# Patient Record
Sex: MALE | Race: OTHER | HISPANIC OR LATINO | URBAN - METROPOLITAN AREA
[De-identification: names, ages, dates, MRNs, and addresses within clinical notes are randomized per-mention and may not be internally consistent; named-entity substitution may affect disease eponyms.]

---

## 2018-01-18 ENCOUNTER — APPOINTMENT (RX ONLY)
Dept: URBAN - METROPOLITAN AREA CLINIC 4 | Facility: CLINIC | Age: 83
Setting detail: DERMATOLOGY
End: 2018-01-18

## 2018-01-18 DIAGNOSIS — L20.89 OTHER ATOPIC DERMATITIS: ICD-10-CM

## 2018-01-18 DIAGNOSIS — L72.8 OTHER FOLLICULAR CYSTS OF THE SKIN AND SUBCUTANEOUS TISSUE: ICD-10-CM

## 2018-01-18 DIAGNOSIS — D18.0 HEMANGIOMA: ICD-10-CM

## 2018-01-18 DIAGNOSIS — D22 MELANOCYTIC NEVI: ICD-10-CM

## 2018-01-18 DIAGNOSIS — L82.1 OTHER SEBORRHEIC KERATOSIS: ICD-10-CM

## 2018-01-18 DIAGNOSIS — L57.0 ACTINIC KERATOSIS: ICD-10-CM

## 2018-01-18 DIAGNOSIS — L81.4 OTHER MELANIN HYPERPIGMENTATION: ICD-10-CM

## 2018-01-18 DIAGNOSIS — I87.2 VENOUS INSUFFICIENCY (CHRONIC) (PERIPHERAL): ICD-10-CM

## 2018-01-18 PROBLEM — D22.72 MELANOCYTIC NEVI OF LEFT LOWER LIMB, INCLUDING HIP: Status: ACTIVE | Noted: 2018-01-18

## 2018-01-18 PROBLEM — D22.62 MELANOCYTIC NEVI OF LEFT UPPER LIMB, INCLUDING SHOULDER: Status: ACTIVE | Noted: 2018-01-18

## 2018-01-18 PROBLEM — L20.84 INTRINSIC (ALLERGIC) ECZEMA: Status: ACTIVE | Noted: 2018-01-18

## 2018-01-18 PROBLEM — D18.01 HEMANGIOMA OF SKIN AND SUBCUTANEOUS TISSUE: Status: ACTIVE | Noted: 2018-01-18

## 2018-01-18 PROBLEM — I83.12 VARICOSE VEINS OF LEFT LOWER EXTREMITY WITH INFLAMMATION: Status: ACTIVE | Noted: 2018-01-18

## 2018-01-18 PROBLEM — D22.71 MELANOCYTIC NEVI OF RIGHT LOWER LIMB, INCLUDING HIP: Status: ACTIVE | Noted: 2018-01-18

## 2018-01-18 PROBLEM — D22.61 MELANOCYTIC NEVI OF RIGHT UPPER LIMB, INCLUDING SHOULDER: Status: ACTIVE | Noted: 2018-01-18

## 2018-01-18 PROBLEM — D22.5 MELANOCYTIC NEVI OF TRUNK: Status: ACTIVE | Noted: 2018-01-18

## 2018-01-18 PROCEDURE — 17003 DESTRUCT PREMALG LES 2-14: CPT

## 2018-01-18 PROCEDURE — 99214 OFFICE O/P EST MOD 30 MIN: CPT | Mod: 25

## 2018-01-18 PROCEDURE — ? SUNSCREEN RECOMMENDATIONS

## 2018-01-18 PROCEDURE — 17000 DESTRUCT PREMALG LESION: CPT

## 2018-01-18 PROCEDURE — ? COUNSELING

## 2018-01-18 PROCEDURE — ? LIQUID NITROGEN

## 2018-01-18 ASSESSMENT — LOCATION DETAILED DESCRIPTION DERM
LOCATION DETAILED: RIGHT CENTRAL MALAR CHEEK
LOCATION DETAILED: LEFT ULNAR DORSAL HAND
LOCATION DETAILED: LEFT SUPERIOR MEDIAL UPPER BACK
LOCATION DETAILED: LEFT PROXIMAL DORSAL FOREARM
LOCATION DETAILED: RIGHT DISTAL POSTERIOR UPPER ARM
LOCATION DETAILED: RIGHT RIB CAGE
LOCATION DETAILED: LEFT PROXIMAL PRETIBIAL REGION
LOCATION DETAILED: RIGHT RADIAL DORSAL HAND
LOCATION DETAILED: RIGHT SUPERIOR MEDIAL MIDBACK
LOCATION DETAILED: LEFT SCAPHA
LOCATION DETAILED: RIGHT ANTERIOR PROXIMAL THIGH
LOCATION DETAILED: RIGHT LATERAL PROXIMAL PRETIBIAL REGION
LOCATION DETAILED: RIGHT INFERIOR MEDIAL UPPER BACK
LOCATION DETAILED: PERIUMBILICAL SKIN
LOCATION DETAILED: LEFT INFERIOR ANTERIOR NECK
LOCATION DETAILED: SUPERIOR THORACIC SPINE
LOCATION DETAILED: LEFT ANTERIOR PROXIMAL THIGH
LOCATION DETAILED: LEFT CENTRAL MALAR CHEEK
LOCATION DETAILED: RIGHT PROXIMAL DORSAL FOREARM
LOCATION DETAILED: LEFT PROXIMAL POSTERIOR UPPER ARM

## 2018-01-18 ASSESSMENT — LOCATION SIMPLE DESCRIPTION DERM
LOCATION SIMPLE: LEFT POSTERIOR UPPER ARM
LOCATION SIMPLE: ABDOMEN
LOCATION SIMPLE: RIGHT CHEEK
LOCATION SIMPLE: UPPER BACK
LOCATION SIMPLE: LEFT THIGH
LOCATION SIMPLE: LEFT UPPER BACK
LOCATION SIMPLE: RIGHT HAND
LOCATION SIMPLE: LEFT CHEEK
LOCATION SIMPLE: LEFT HAND
LOCATION SIMPLE: LEFT PRETIBIAL REGION
LOCATION SIMPLE: RIGHT LOWER BACK
LOCATION SIMPLE: RIGHT FOREARM
LOCATION SIMPLE: RIGHT POSTERIOR UPPER ARM
LOCATION SIMPLE: LEFT ANTERIOR NECK
LOCATION SIMPLE: LEFT EAR
LOCATION SIMPLE: RIGHT UPPER BACK
LOCATION SIMPLE: RIGHT THIGH
LOCATION SIMPLE: RIGHT PRETIBIAL REGION
LOCATION SIMPLE: LEFT FOREARM

## 2018-01-18 ASSESSMENT — LOCATION ZONE DERM
LOCATION ZONE: TRUNK
LOCATION ZONE: NECK
LOCATION ZONE: HAND
LOCATION ZONE: LEG
LOCATION ZONE: FACE
LOCATION ZONE: EAR
LOCATION ZONE: ARM

## 2018-01-18 NOTE — PROCEDURE: LIQUID NITROGEN
Duration Of Freeze Thaw-Cycle (Seconds): 3
Consent: The patient's consent was obtained including but not limited to risks of crusting, scabbing, blistering, scarring, darker or lighter pigmentary change, recurrence, incomplete removal and infection.
Render Post-Care Instructions In Note?: no
Number Of Freeze-Thaw Cycles: 2 freeze-thaw cycles
Post-Care Instructions: I reviewed with the patient in detail post-care instructions. Patient is to wear sunprotection, and avoid picking at any of the treated lesions. Pt may apply Vaseline to crusted or scabbing areas.
Detail Level: Detailed

## 2019-11-22 ENCOUNTER — HOSPITAL ENCOUNTER (EMERGENCY)
Facility: MEDICAL CENTER | Age: 84
End: 2019-11-22
Attending: EMERGENCY MEDICINE
Payer: MEDICARE

## 2019-11-22 VITALS
WEIGHT: 169 LBS | BODY MASS INDEX: 27.16 KG/M2 | OXYGEN SATURATION: 91 % | TEMPERATURE: 98.2 F | HEART RATE: 75 BPM | SYSTOLIC BLOOD PRESSURE: 143 MMHG | HEIGHT: 66 IN | RESPIRATION RATE: 16 BRPM | DIASTOLIC BLOOD PRESSURE: 60 MMHG
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
## 2019-11-22 DIAGNOSIS — F03.90 DEMENTIA WITHOUT BEHAVIORAL DISTURBANCE, UNSPECIFIED DEMENTIA TYPE: ICD-10-CM

## 2019-11-22 PROCEDURE — 99284 EMERGENCY DEPT VISIT MOD MDM: CPT

## 2019-11-22 PROCEDURE — 304561 HCHG STAT O2

## 2019-11-22 ASSESSMENT — LIFESTYLE VARIABLES
TOTAL SCORE: 0
TOTAL SCORE: 0
HAVE PEOPLE ANNOYED YOU BY CRITICIZING YOUR DRINKING: NO
TOTAL SCORE: 0
TOTAL SCORE: 0
EVER FELT BAD OR GUILTY ABOUT YOUR DRINKING: NO
TOTAL SCORE: 0
CONSUMPTION TOTAL: INCOMPLETE
DO YOU DRINK ALCOHOL: NO
HAVE PEOPLE ANNOYED YOU BY CRITICIZING YOUR DRINKING: NO
EVER HAD A DRINK FIRST THING IN THE MORNING TO STEADY YOUR NERVES TO GET RID OF A HANGOVER: NO
TOTAL SCORE: 0
EVER HAD A DRINK FIRST THING IN THE MORNING TO STEADY YOUR NERVES TO GET RID OF A HANGOVER: NO
CONSUMPTION TOTAL: INCOMPLETE
HAVE YOU EVER FELT YOU SHOULD CUT DOWN ON YOUR DRINKING: NO
HAVE YOU EVER FELT YOU SHOULD CUT DOWN ON YOUR DRINKING: NO
EVER FELT BAD OR GUILTY ABOUT YOUR DRINKING: NO
DO YOU DRINK ALCOHOL: NO

## 2019-11-22 ASSESSMENT — PAIN SCALES - WONG BAKER: WONGBAKER_NUMERICALRESPONSE: HURTS A LITTLE MORE

## 2019-11-22 ASSESSMENT — PAIN DESCRIPTION - DESCRIPTORS: DESCRIPTORS: ACHING

## 2019-11-22 NOTE — ED TRIAGE NOTES
Pt presents to ED via EMS for GLF at home no thinners, no LOC, no head strike.  Pt reports right rib and back pain. Pt has dementia, wife main information provider.

## 2019-11-22 NOTE — ED PROVIDER NOTES
"ED Provider Note    Scribed for Krys Grande M.D. by John Anderson. 11/22/2019, 9:41 AM.    Primary care provider: Carlton PEREZ M.D.  Means of arrival: EMS  History obtained from: patient, spouse  History limited by: dementia    CHIEF COMPLAINT  Chief Complaint   Patient presents with   • T-5000 FALL   • Rib Pain       HPI  Ankush Lira is a 89 y.o. male with a history of dementia who presents to the Emergency Department complaining of rib and back pain status post ground level fall sustained just prior to arrival.  Wife states that the patient was walking down to the basement and sustained a ground level fall at the bottom of the stairs onto concrete. She reports that there were no signs of impact to his head and his mentation is at baseline at this time. Wife was unable to get the patient to his feet, so EMS was called.     Further HPI cannot be obtained due to the patient's dementia.    REVIEW OF SYSTEMS  Pertinent positives include ground level fall, rib pain, back pain.      Further ROS cannot be obtained due to the patient's dementia.    PAST MEDICAL HISTORY   Dementia    SURGICAL HISTORY  patient denies any surgical history    SOCIAL HISTORY  Patient presents to the ED with wife who he lives with.     FAMILY HISTORY  None noted    CURRENT MEDICATIONS  Current Outpatient Medications:   •  NON SPECIFIED, Synthroid med , Disp: , Rfl:     ALLERGIES  Not on File    PHYSICAL EXAM  VITAL SIGNS: /60   Pulse 75   Temp 36.8 °C (98.2 °F) (Temporal)   Resp 16   Ht 1.676 m (5' 6\")   Wt 76.7 kg (169 lb)   SpO2 91%   BMI 27.28 kg/m²   Constitutional: Alert in no apparent distress. Well apearing  HENT: Normocephalic, Atraumatic, Bilateral external ears normal. Nose normal.   Eyes:  Conjunctiva normal, non-icteric.   Heart: Regular rate and rhythm no murmurs rubs or gallops  Lungs: Non-labored respirations  Skin: Warm, Dry, No erythema, No rash.   Neurologic: Alert, Grossly non-focal. "   Psychiatric: Demented but alert and appropriate  Extremities: no obvious deformities        COURSE & MEDICAL DECISION MAKING  Pertinent Labs & Imaging studies reviewed. (See chart for details)    Differential diagnoses include but are not limited to: multiple system contusion    9:41 AM - Patient seen and examined at bedside. Wife is the primary history provider. She informs that the patient is at baseline mentation. Patient is somewhat uncooperative and wife is assisting with exam and history taking.  The patient has significant dementia and is quite paranoid about everybody in the room especially people that he does not know.  He is not complaining of any pain but he would not allow me to assess his back.    10:23 AM  Ultimately patient was able to ambulate without difficulty to the bathroom he was not complaining of any pain.  His wife does not think he hit his head he is not on any blood thinners I do not think imaging is necessary at this time but of advised him to return should he develop any worsening pain       The patient will return for new or worsening symptoms and is stable at the time of discharge. Patient was given return precautions. Patient and/or family member verbalizes understanding and will comply.    DISPOSITION:  Patient will be discharged home in stable condition.    FOLLOW UP:  Carlton PEREZ M.D.  [address] [facility] [address] [phone]    Schedule an appointment as soon as possible for a visit in 1 week      [facility], Emergency Dept  [address] [facility] [address] [phone]    Return for worsening pain, weakness or other concerns      OUTPATIENT MEDICATIONS:  New Prescriptions    No medications on file         FINAL IMPRESSION  1. Fall, initial encounter    2. Dementia without behavioral disturbance, unspecified dementia type (HCC)         This dictation has been created using voice recognition software and/or scribes. The accuracy of  the dictation is limited by the abilities of the software and the expertise of the scribes. I expect there may be some errors of grammar and possibly content. I made every attempt to manually correct the errors within my dictation. However, errors related to voice recognition software and/or scribes may still exist and should be interpreted within the appropriate context.     IJohn (Scribe), am scribing for, and in the presence of, Krys Grande M.D..    Electronically signed by: John Anderson (Scribe), 11/22/2019    I, Krys Grande M.D. personally performed the services described in this documentation, as scribed by John Anderson in my presence, and it is both accurate and complete. C    The note accurately reflects work and decisions made by me.  Krys Grande  11/22/2019  10:25 AM

## 2019-11-22 NOTE — ED NOTES
Patient understands discharge instructions. Given all DC education,  f/u appointments. Pt verbalized understanding.  In no distress. IV and telemetry dc'd. Has all belongings.  Ambulating well with steady gait. Will return for worsening symptoms.